# Patient Record
Sex: MALE | Race: WHITE
[De-identification: names, ages, dates, MRNs, and addresses within clinical notes are randomized per-mention and may not be internally consistent; named-entity substitution may affect disease eponyms.]

---

## 2022-12-13 ENCOUNTER — HOSPITAL ENCOUNTER (EMERGENCY)
Dept: HOSPITAL 4 - SED | Age: 67
LOS: 1 days | Discharge: HOME | End: 2022-12-14
Payer: MEDICARE

## 2022-12-13 VITALS — BODY MASS INDEX: 35.88 KG/M2 | WEIGHT: 195 LBS | HEIGHT: 62 IN

## 2022-12-13 VITALS — SYSTOLIC BLOOD PRESSURE: 166 MMHG
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
## 2022-12-13 DIAGNOSIS — S33.5XXA: Primary | ICD-10-CM

## 2022-12-13 DIAGNOSIS — E11.9: ICD-10-CM

## 2022-12-13 DIAGNOSIS — E78.5: ICD-10-CM

## 2022-12-13 DIAGNOSIS — I10: ICD-10-CM
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
## 2022-12-13 DIAGNOSIS — Z79.899: ICD-10-CM

## 2022-12-13 PROCEDURE — 99283 EMERGENCY DEPT VISIT LOW MDM: CPT

## 2022-12-13 PROCEDURE — 96372 THER/PROPH/DIAG INJ SC/IM: CPT

## 2022-12-13 NOTE — NUR
Pt brought by DAMEON BETHEA&Ox4, pt presents to ER with lower back pain, denies trauma 
pt was diagnosed with covid last Thursday, skin pink and warm, cap refill <3, 
VSS, will cont to monitor.

## 2022-12-14 VITALS — SYSTOLIC BLOOD PRESSURE: 129 MMHG

## 2022-12-14 NOTE — NUR
Patient given written and verbal discharge instructions and verbalizes 
understanding.  ER MD Bolden discussed with patient the results and treatment 
provided. Patient in stable condition. ID arm band removed. 

Rx of Naproxen sent to preferred pharmacy. Patient educated on pain management 
and to follow up with PMD. Pain Scale 0/10

Opportunity for questions provided and answered. Medication side effect fact 
sheet provided.